# Patient Record
Sex: FEMALE | Race: WHITE
[De-identification: names, ages, dates, MRNs, and addresses within clinical notes are randomized per-mention and may not be internally consistent; named-entity substitution may affect disease eponyms.]

---

## 2020-05-14 ENCOUNTER — HOSPITAL ENCOUNTER (OUTPATIENT)
Dept: HOSPITAL 100 - MTRAD | Age: 55
End: 2020-05-14
Payer: COMMERCIAL

## 2020-05-14 DIAGNOSIS — R05: Primary | ICD-10-CM

## 2020-05-14 PROCEDURE — 71046 X-RAY EXAM CHEST 2 VIEWS: CPT

## 2020-05-26 ENCOUNTER — HOSPITAL ENCOUNTER (OUTPATIENT)
Age: 55
End: 2020-05-26
Payer: COMMERCIAL

## 2020-05-26 DIAGNOSIS — K58.9: ICD-10-CM

## 2020-05-26 DIAGNOSIS — E55.9: Primary | ICD-10-CM

## 2020-05-26 DIAGNOSIS — E78.00: ICD-10-CM

## 2020-05-26 LAB
CHOLEST SERPL-MCNC: 244 MG/DL
TRIGLYCERIDES: 132 MG/DL
VITAMIN D,25 HYDROXY: 19.9 NG/ML
VLDLC SERPL-MCNC: 26 MG/DL (ref 5–40)

## 2020-05-26 PROCEDURE — 80061 LIPID PANEL: CPT

## 2020-05-26 PROCEDURE — 82306 VITAMIN D 25 HYDROXY: CPT

## 2020-05-26 PROCEDURE — 36415 COLL VENOUS BLD VENIPUNCTURE: CPT

## 2020-06-29 ENCOUNTER — HOSPITAL ENCOUNTER (OUTPATIENT)
Age: 55
End: 2020-06-29
Payer: COMMERCIAL

## 2020-06-29 DIAGNOSIS — K50.90: Primary | ICD-10-CM

## 2020-06-29 PROCEDURE — A4216 STERILE WATER/SALINE, 10 ML: HCPCS

## 2020-06-29 PROCEDURE — 74177 CT ABD & PELVIS W/CONTRAST: CPT

## 2020-07-31 ENCOUNTER — HOSPITAL ENCOUNTER (OUTPATIENT)
Age: 55
End: 2020-07-31
Payer: COMMERCIAL

## 2020-07-31 DIAGNOSIS — K50.10: Primary | ICD-10-CM

## 2020-09-08 ENCOUNTER — HOSPITAL ENCOUNTER (OUTPATIENT)
Age: 55
End: 2020-09-08
Payer: COMMERCIAL

## 2020-09-08 DIAGNOSIS — K50.10: Primary | ICD-10-CM

## 2020-09-08 LAB
ALANINE AMINOTRANSFER ALT/SGPT: 22 U/L (ref 13–56)
ALBUMIN SERPL-MCNC: 3.5 G/DL (ref 3.2–5)
ALKALINE PHOSPHATASE: 89 U/L (ref 45–117)
ANION GAP: 6 (ref 5–15)
AST(SGOT): 15 U/L (ref 15–37)
BUN SERPL-MCNC: 13 MG/DL (ref 7–18)
BUN/CREAT RATIO: 16.2 RATIO (ref 10–20)
CALCIUM SERPL-MCNC: 9.2 MG/DL (ref 8.5–10.1)
CARBON DIOXIDE: 29 MMOL/L (ref 21–32)
CHLORIDE: 104 MMOL/L (ref 98–107)
CHOLEST SERPL-MCNC: 241 MG/DL
CRP SERPL-MCNC: 12.4 MG/L (ref 0–3)
EST GLOM FILT RATE - AFR AMER: 95 ML/MIN (ref 60–?)
GLOBULIN: 4 G/DL (ref 2.2–4.2)
GLUCOSE: 93 MG/DL (ref 74–106)
POTASSIUM: 3.5 MMOL/L (ref 3.5–5.1)
TRIGLYCERIDES: 134 MG/DL
VLDLC SERPL-MCNC: 27 MG/DL (ref 5–40)

## 2020-09-08 PROCEDURE — 80053 COMPREHEN METABOLIC PANEL: CPT

## 2020-09-08 PROCEDURE — 36415 COLL VENOUS BLD VENIPUNCTURE: CPT

## 2020-09-08 PROCEDURE — 86140 C-REACTIVE PROTEIN: CPT

## 2020-09-08 PROCEDURE — 87340 HEPATITIS B SURFACE AG IA: CPT

## 2020-09-08 PROCEDURE — 80061 LIPID PANEL: CPT

## 2020-09-08 PROCEDURE — 86480 TB TEST CELL IMMUN MEASURE: CPT

## 2020-09-11 LAB
M TB TUBERC IFN-G BLD QL: 0.03 IU/ML
QNTFERON TB MITOGEN VALUE: > 10 IU/ML
QNTFERON TB NIL VALUE: 0.02 IU/ML
QNTFERON TB2+ AG VALUE: 0.03 IU/ML

## 2020-10-19 ENCOUNTER — HOSPITAL ENCOUNTER (OUTPATIENT)
Age: 55
End: 2020-10-19
Payer: COMMERCIAL

## 2020-10-19 DIAGNOSIS — R00.2: Primary | ICD-10-CM

## 2020-10-19 PROCEDURE — 36415 COLL VENOUS BLD VENIPUNCTURE: CPT

## 2020-10-19 PROCEDURE — 84443 ASSAY THYROID STIM HORMONE: CPT

## 2021-03-08 ENCOUNTER — HOSPITAL ENCOUNTER (OUTPATIENT)
Age: 56
End: 2021-03-08
Payer: COMMERCIAL

## 2021-03-08 DIAGNOSIS — K50.90: Primary | ICD-10-CM

## 2021-03-08 LAB
CRP SERPL-MCNC: 4.92 MG/L (ref 0–3)
DEPRECATED RDW RBC: 41.5 FL (ref 35.1–43.9)
ERYTHROCYTE [DISTWIDTH] IN BLOOD: 12.8 % (ref 11.6–14.6)
HCT VFR BLD AUTO: 37.5 % (ref 37–47)
HEMOGLOBIN: 12.6 G/DL (ref 12–15)
HGB BLD-MCNC: 12.6 G/DL (ref 12–15)
MCV RBC: 88.2 FL (ref 81–99)
MEAN CORP HGB CONC: 33.6 G/DL (ref 32–36)
MEAN PLATELET VOL.: 10.3 FL (ref 6.2–12)
PLATELET # BLD: 392 K/MM3 (ref 150–450)
PLATELET COUNT: 392 K/MM3 (ref 150–450)
RBC # BLD AUTO: 4.25 M/MM3 (ref 4.2–5.4)
RBC DISTRIBUTION WIDTH CV: 12.8 % (ref 11.6–14.6)
RBC DISTRIBUTION WIDTH SD: 41.5 FL (ref 35.1–43.9)
WBC # BLD AUTO: 10 K/MM3 (ref 4.4–11)
WHITE BLOOD COUNT: 10 K/MM3 (ref 4.4–11)

## 2021-03-08 PROCEDURE — 85027 COMPLETE CBC AUTOMATED: CPT

## 2021-03-08 PROCEDURE — 36415 COLL VENOUS BLD VENIPUNCTURE: CPT

## 2021-03-08 PROCEDURE — 86140 C-REACTIVE PROTEIN: CPT

## 2021-03-19 ENCOUNTER — HOSPITAL ENCOUNTER (OUTPATIENT)
Dept: HOSPITAL 100 - IMMUN | Age: 56
End: 2021-03-19
Payer: COMMERCIAL

## 2021-03-19 ENCOUNTER — HOSPITAL ENCOUNTER (OUTPATIENT)
Dept: HOSPITAL 100 - RAD | Age: 56
End: 2021-03-19
Payer: COMMERCIAL

## 2021-03-19 DIAGNOSIS — K50.90: Primary | ICD-10-CM

## 2021-03-19 DIAGNOSIS — Z23: Primary | ICD-10-CM

## 2021-03-19 PROCEDURE — 0001A: CPT

## 2021-03-19 PROCEDURE — 74270 X-RAY XM COLON 1CNTRST STD: CPT

## 2021-03-19 PROCEDURE — 0002A: CPT

## 2021-03-19 PROCEDURE — 91300: CPT

## 2021-03-19 RX ADMIN — RNA INGREDIENT BNT-162B2 30 MCG: 0.23 INJECTION, SUSPENSION INTRAMUSCULAR at 14:48

## 2021-04-09 RX ADMIN — RNA INGREDIENT BNT-162B2 30 MCG: 0.23 INJECTION, SUSPENSION INTRAMUSCULAR at 14:40

## 2021-09-03 ENCOUNTER — HOSPITAL ENCOUNTER (OUTPATIENT)
Age: 56
End: 2021-09-03
Payer: COMMERCIAL

## 2021-09-03 DIAGNOSIS — R53.83: ICD-10-CM

## 2021-09-03 DIAGNOSIS — E55.9: ICD-10-CM

## 2021-09-03 DIAGNOSIS — I10: ICD-10-CM

## 2021-09-03 DIAGNOSIS — K50.90: Primary | ICD-10-CM

## 2021-09-03 LAB
ALANINE AMINOTRANSFER ALT/SGPT: 34 U/L (ref 13–56)
ALBUMIN SERPL-MCNC: 3.6 G/DL (ref 3.2–5)
ALKALINE PHOSPHATASE: 105 U/L (ref 45–117)
ANION GAP: 6 (ref 5–15)
AST(SGOT): 17 U/L (ref 15–37)
BUN SERPL-MCNC: 13 MG/DL (ref 7–18)
BUN/CREAT RATIO: 18.4 RATIO (ref 10–20)
CALCIUM SERPL-MCNC: 8.9 MG/DL (ref 8.5–10.1)
CARBON DIOXIDE: 28 MMOL/L (ref 21–32)
CHLORIDE: 105 MMOL/L (ref 98–107)
CHOLEST SERPL-MCNC: 264 MG/DL
DEPRECATED RDW RBC: 40.4 FL (ref 35.1–43.9)
ERYTHROCYTE [DISTWIDTH] IN BLOOD: 12.6 % (ref 11.6–14.6)
EST GLOM FILT RATE - AFR AMER: 110 ML/MIN (ref 60–?)
GLOBULIN: 4 G/DL (ref 2.2–4.2)
GLUCOSE: 97 MG/DL (ref 74–106)
HCT VFR BLD AUTO: 37.9 % (ref 37–47)
HEMOGLOBIN: 13 G/DL (ref 12–15)
HGB BLD-MCNC: 13 G/DL (ref 12–15)
IMMATURE GRANULOCYTES COUNT: 0.03 X10^3/UL (ref 0–0)
MCV RBC: 87.5 FL (ref 81–99)
MEAN CORP HGB CONC: 34.3 G/DL (ref 32–36)
MEAN PLATELET VOL.: 9.8 FL (ref 6.2–12)
NRBC FLAGGED BY ANALYZER: 0 % (ref 0–5)
PLATELET # BLD: 338 K/MM3 (ref 150–450)
PLATELET COUNT: 338 K/MM3 (ref 150–450)
POTASSIUM: 3.8 MMOL/L (ref 3.5–5.1)
RBC # BLD AUTO: 4.33 M/MM3 (ref 4.2–5.4)
RBC DISTRIBUTION WIDTH CV: 12.6 % (ref 11.6–14.6)
RBC DISTRIBUTION WIDTH SD: 40.4 FL (ref 35.1–43.9)
TRIGLYCERIDES: 154 MG/DL
VITAMIN B12: 915 PG/ML (ref 211–911)
VITAMIN D,25 HYDROXY: 31.5 NG/ML
VLDLC SERPL-MCNC: 31 MG/DL (ref 5–40)
WBC # BLD AUTO: 7.5 K/MM3 (ref 4.4–11)
WHITE BLOOD COUNT: 7.5 K/MM3 (ref 4.4–11)

## 2021-09-03 PROCEDURE — 82306 VITAMIN D 25 HYDROXY: CPT

## 2021-09-03 PROCEDURE — 82607 VITAMIN B-12: CPT

## 2021-09-03 PROCEDURE — 36415 COLL VENOUS BLD VENIPUNCTURE: CPT

## 2021-09-03 PROCEDURE — 80053 COMPREHEN METABOLIC PANEL: CPT

## 2021-09-03 PROCEDURE — 80061 LIPID PANEL: CPT

## 2021-09-03 PROCEDURE — 85025 COMPLETE CBC W/AUTO DIFF WBC: CPT

## 2021-09-07 ENCOUNTER — HOSPITAL ENCOUNTER (OUTPATIENT)
Age: 56
End: 2021-09-07
Payer: COMMERCIAL

## 2021-09-07 DIAGNOSIS — U07.1: Primary | ICD-10-CM

## 2021-09-07 PROCEDURE — U0005 INFEC AGEN DETEC AMPLI PROBE: HCPCS

## 2021-09-07 PROCEDURE — 87635 SARS-COV-2 COVID-19 AMP PRB: CPT

## 2021-09-07 PROCEDURE — U0003 INFECTIOUS AGENT DETECTION BY NUCLEIC ACID (DNA OR RNA); SEVERE ACUTE RESPIRATORY SYNDROME CORONAVIRUS 2 (SARS-COV-2) (CORONAVIRUS DISEASE [COVID-19]), AMPLIFIED PROBE TECHNIQUE, MAKING USE OF HIGH THROUGHPUT TECHNOLOGIES AS DESCRIBED BY CMS-2020-01-R: HCPCS

## 2022-02-25 ENCOUNTER — HOSPITAL ENCOUNTER (OUTPATIENT)
Age: 57
Discharge: HOME | End: 2022-02-25
Payer: COMMERCIAL

## 2022-02-25 DIAGNOSIS — K50.90: Primary | ICD-10-CM

## 2022-02-25 LAB
CREATININE FINGERSTICK: 0.7 MG/DL (ref 0.55–1.02)
EGFR FINGERSTICK: > 60 ML/MIN (ref 60–?)

## 2022-02-25 PROCEDURE — 74177 CT ABD & PELVIS W/CONTRAST: CPT

## 2022-05-23 ENCOUNTER — HOSPITAL ENCOUNTER (OUTPATIENT)
Dept: HOSPITAL 100 - OPBI | Age: 57
Discharge: HOME | End: 2022-05-23
Payer: COMMERCIAL

## 2022-05-23 DIAGNOSIS — Z12.31: Primary | ICD-10-CM

## 2022-05-23 DIAGNOSIS — Z80.3: ICD-10-CM

## 2022-05-23 PROCEDURE — 77063 BREAST TOMOSYNTHESIS BI: CPT

## 2022-05-23 PROCEDURE — 77067 SCR MAMMO BI INCL CAD: CPT

## 2023-03-29 ENCOUNTER — HOSPITAL ENCOUNTER (OUTPATIENT)
Dept: DATA CONVERSION | Facility: HOSPITAL | Age: 58
End: 2023-03-29
Attending: INTERNAL MEDICINE | Admitting: INTERNAL MEDICINE
Payer: COMMERCIAL

## 2023-03-29 DIAGNOSIS — K50.112 CROHN'S DISEASE OF LARGE INTESTINE WITH INTESTINAL OBSTRUCTION (MULTI): ICD-10-CM

## 2023-03-29 DIAGNOSIS — K21.9 GASTRO-ESOPHAGEAL REFLUX DISEASE WITHOUT ESOPHAGITIS: ICD-10-CM

## 2023-03-29 DIAGNOSIS — J45.909 UNSPECIFIED ASTHMA, UNCOMPLICATED (HHS-HCC): ICD-10-CM

## 2023-03-29 DIAGNOSIS — Z88.0 ALLERGY STATUS TO PENICILLIN: ICD-10-CM

## 2023-03-29 DIAGNOSIS — Z98.0 INTESTINAL BYPASS AND ANASTOMOSIS STATUS: ICD-10-CM

## 2023-03-29 DIAGNOSIS — Z12.11 ENCOUNTER FOR SCREENING FOR MALIGNANT NEOPLASM OF COLON: ICD-10-CM

## 2023-03-29 DIAGNOSIS — K50.90 CROHN'S DISEASE, UNSPECIFIED, WITHOUT COMPLICATIONS (MULTI): ICD-10-CM

## 2023-03-29 DIAGNOSIS — E78.00 PURE HYPERCHOLESTEROLEMIA, UNSPECIFIED: ICD-10-CM

## 2023-03-29 DIAGNOSIS — F41.9 ANXIETY DISORDER, UNSPECIFIED: ICD-10-CM

## 2023-03-29 DIAGNOSIS — I10 ESSENTIAL (PRIMARY) HYPERTENSION: ICD-10-CM

## 2023-03-29 DIAGNOSIS — K52.9 NONINFECTIVE GASTROENTERITIS AND COLITIS, UNSPECIFIED: ICD-10-CM

## 2023-03-29 DIAGNOSIS — Z90.49 ACQUIRED ABSENCE OF OTHER SPECIFIED PARTS OF DIGESTIVE TRACT: ICD-10-CM

## 2023-04-06 LAB
COMPLETE PATHOLOGY REPORT: NORMAL
CONVERTED CLINICAL DIAGNOSIS-HISTORY: NORMAL
CONVERTED FINAL DIAGNOSIS: NORMAL
CONVERTED FINAL REPORT PDF LINK TO COPY AND PASTE: NORMAL
CONVERTED GROSS DESCRIPTION: NORMAL

## 2023-09-19 ENCOUNTER — HOSPITAL ENCOUNTER (OUTPATIENT)
Age: 58
Discharge: HOME | End: 2023-09-19
Payer: SELF-PAY

## 2023-09-19 DIAGNOSIS — D50.9: ICD-10-CM

## 2023-09-19 DIAGNOSIS — I10: ICD-10-CM

## 2023-09-19 DIAGNOSIS — E78.00: Primary | ICD-10-CM

## 2023-09-19 DIAGNOSIS — E55.9: ICD-10-CM

## 2023-09-19 LAB
ALANINE AMINOTRANSFER ALT/SGPT: 45 U/L (ref 13–56)
ALBUMIN SERPL-MCNC: 3.4 G/DL (ref 3.2–5)
ALKALINE PHOSPHATASE: 115 U/L (ref 45–117)
ANION GAP: 5 (ref 5–15)
AST(SGOT): 27 U/L (ref 15–37)
BUN SERPL-MCNC: 16 MG/DL (ref 7–18)
BUN/CREAT RATIO: 23 RATIO (ref 10–20)
CALCIUM SERPL-MCNC: 8.8 MG/DL (ref 8.5–10.1)
CARBON DIOXIDE: 27 MMOL/L (ref 21–32)
CHLORIDE: 106 MMOL/L (ref 98–107)
CHOLEST SERPL-MCNC: 165 MG/DL
CREAT UR-MCNC: 105 MG/DL
DEPRECATED RDW RBC: 43.8 FL (ref 35.1–43.9)
ERYTHROCYTE [DISTWIDTH] IN BLOOD: 13.2 % (ref 11.6–14.6)
EST GLOM FILT RATE - AFR AMER: 111 ML/MIN (ref 60–?)
FERRITIN SERPL-MCNC: 43 NG/ML (ref 8–252)
GLOBULIN: 3.7 G/DL (ref 2.2–4.2)
GLUCOSE: 108 MG/DL (ref 74–106)
HCT VFR BLD AUTO: 36.5 % (ref 37–47)
HEMOGLOBIN: 12 G/DL (ref 12–15)
HGB BLD-MCNC: 12 G/DL (ref 12–15)
IMMATURE GRANULOCYTES COUNT: 0.05 X10^3/UL (ref 0–0)
MCV RBC: 90.3 FL (ref 81–99)
MEAN CORP HGB CONC: 32.9 G/DL (ref 32–36)
MEAN PLATELET VOL.: 10 FL (ref 6.2–12)
MICROALBUMIN UR-MCNC: < 5 MG/L
NRBC FLAGGED BY ANALYZER: 0 % (ref 0–5)
PLATELET # BLD: 372 K/MM3 (ref 150–450)
PLATELET COUNT: 372 K/MM3 (ref 150–450)
POTASSIUM: 3.9 MMOL/L (ref 3.5–5.1)
RBC # BLD AUTO: 4.04 M/MM3 (ref 4.2–5.4)
RBC DISTRIBUTION WIDTH CV: 13.2 % (ref 11.6–14.6)
RBC DISTRIBUTION WIDTH SD: 43.8 FL (ref 35.1–43.9)
TRIGLYCERIDES: 120 MG/DL
VITAMIN D,25 HYDROXY: 39.4 NG/ML
VLDLC SERPL-MCNC: 24 MG/DL (ref 5–40)
WBC # BLD AUTO: 9.3 K/MM3 (ref 4.4–11)
WHITE BLOOD COUNT: 9.3 K/MM3 (ref 4.4–11)

## 2023-09-19 PROCEDURE — 82728 ASSAY OF FERRITIN: CPT

## 2023-09-19 PROCEDURE — 36415 COLL VENOUS BLD VENIPUNCTURE: CPT

## 2023-09-19 PROCEDURE — 80053 COMPREHEN METABOLIC PANEL: CPT

## 2023-09-19 PROCEDURE — 85025 COMPLETE CBC W/AUTO DIFF WBC: CPT

## 2023-09-19 PROCEDURE — 83036 HEMOGLOBIN GLYCOSYLATED A1C: CPT

## 2023-09-19 PROCEDURE — 82306 VITAMIN D 25 HYDROXY: CPT

## 2023-09-19 PROCEDURE — 82570 ASSAY OF URINE CREATININE: CPT

## 2023-09-19 PROCEDURE — 80061 LIPID PANEL: CPT

## 2023-09-19 PROCEDURE — 82043 UR ALBUMIN QUANTITATIVE: CPT

## 2023-09-19 PROCEDURE — 84443 ASSAY THYROID STIM HORMONE: CPT

## 2023-12-12 ENCOUNTER — TELEMEDICINE (OUTPATIENT)
Dept: GASTROENTEROLOGY | Facility: HOSPITAL | Age: 58
End: 2023-12-12
Payer: COMMERCIAL

## 2023-12-12 DIAGNOSIS — K50.019 CROHN'S DISEASE OF SMALL INTESTINE WITH COMPLICATION (MULTI): Primary | ICD-10-CM

## 2023-12-12 PROCEDURE — 99214 OFFICE O/P EST MOD 30 MIN: CPT | Performed by: INTERNAL MEDICINE

## 2023-12-12 NOTE — PATIENT INSTRUCTIONS
Thank you for speaking with me today for a virtual telehealth visit.  I am glad that, overall, you are tolerating Stelara and your Crohn's disease seems under control.  I hope that you recover from your current COVID infection without difficulty.  As a reminder, if you are ill, you should not dose with your Stelara but you should hold the medication until feeling better and then resume dosing once improved.  If you have any questions about this call the office.  As we reviewed today:    Plan:  PLAN  1) hold Stelara until at least 12/24/2023.  If at that time you are feeling well, you can dose with your Stelara.  However, if you are not feeling well at that time you should continue to hold Stelara and call the office for instructions  2) once you resume Stelara dosing, continue every 8 weeks  3) check labs in February 2024 as ordered today; you can do this at any [facility] laboratory such as the 1 in [place]  4) as long as symptoms are stable and you are feeling okay, follow-up in the office between September and December 2024  5) call the office with any worsening symptoms, questions, or concerns

## 2023-12-12 NOTE — PROGRESS NOTES
REASON FOR VISIT:  F/U for Crohn's disease    HPI:  Justina Martinez is a 58 y.o. female who presents for follow-up of Crohn's Disease since 2020, with proximal transverse colon stricture with history of LGD on biopsy. Disease was only minimally symptomatic. Patient was treated with Humira every 2 weeks and mesalamine. Her disease was incidentally diagnosed during her first screening colonoscopy 2020. During that procedure, severe inflammation and colonic stricture at transverse colon.     5/2021 subsequent colonoscopy with dilation and biopsy with LGD. Again underwent endoscopy and colonic balloon dilation with biopsy on 11/12/21 with TVA. She never had a complete evaluation beyond the stricture.      CTE on 2/25/22 demonstrated diffuse narrowing from hepatic flexure to rectosigmoid colon. She underwent exploratory laparotomy, abdominal colectomy and CAROL on 6/13/22.     Seen for post-op discussion 8/2022. She never did well on Humira and didn't seem to tolerate well. Decision made to initiate Stelara (9/2022) for post-op maintenance. When first seen, stools were 8 times daily; loperamide added.  With time did better with stools down to 4 daily.      3/2023 flex sig showed inactive rectal disease.  Otherwise was normal.    In follow-up today, patient reports that she had an upper respiratory infection in November 2023.  She was treated with antibiotics.  She did not have labs or x-rays.  This improved, but, unfortunately her  became ill with COVID and now she is also COVID-positive.  She is due to dose with her Stelara on Sunday, 12/17/2024.    Overall, besides the upper respiratory infection and the COVID she has been doing well.  She has tolerated Stelara without difficulty.  Her bowel movements are 3-4 daily.  The stool is soft.  Is looser if she eats fatty food.  She has no constipation.  Her bowel habits vary a bit in form based on what she eats.  She is slowly adding fruits and vegetables to her diet.   She does have some urgency and has had a rare episode of incontinence.  She is tolerating Stelara without difficulty.  There are no side effects associated with this.  She does, however, notes some ankle and foot tenderness.  REVIEW OF SYSTEMS  Complete review of systems otherwise negative per complaint    Past Medical History:   Diagnosis Date    Essential (primary) hypertension 2022    Hypertension, unspecified type    Personal history of other endocrine, nutritional and metabolic disease     History of high cholesterol    Personal history of other endocrine, nutritional and metabolic disease     History of diabetes mellitus       Past Surgical History:   Procedure Laterality Date    OTHER SURGICAL HISTORY  02/15/2022     section    OTHER SURGICAL HISTORY  02/15/2022    Back surgery    OTHER SURGICAL HISTORY  02/15/2022    History of prior surgery    OTHER SURGICAL HISTORY  2022    Colonoscopy    OTHER SURGICAL HISTORY  2022    Appendectomy    OTHER SURGICAL HISTORY  2022    Colectomy subtotal       No current outpatient medications on file.     No current facility-administered medications for this visit.       PHYSICAL EXAM:  There were no vitals taken for this visit.     Lab Results   Component Value Date    WBC 8.6 2023    HGB 12.0 2023    HCT 36.6 2023    MCV 89 2023     2023     Lab Results   Component Value Date    ALT 28 2023    AST 20 2023    ALKPHOS 102 2023    BILITOT 0.5 2023         ASSESSMENT  #Crohn's disease-diagnosed in  with colonic stricture and low-grade dysplasia.  She is status post abdominal colectomy with ileorectal anastomosis in 2021.  Postoperatively we started Stelara which she has been tolerating without difficulty.  Bowel habits have gradually improved.  Flexible sigmoidoscopy in 2023 showed no disease activity.    She now has COVID and is due to dose with Stelara in several days.   She was instructed to hold her Stelara until at least 12/24/2023.  If she is feeling well at that time she can then go ahead and dose.  We did review risk associated with Stelara therapy including but not limited to the risk of infection and lymphoma.  She knows to hold her Stelara if she is feeling ill at the time that she is due to dose.    She is due for some laboratory monitoring in the early part of 2024 which will be ordered.  Otherwise, as long as she is doing well she can follow-up sometime between 9 and 12 months.  I do not think she needs another sigmoidoscopy until 2026 unless she has worsening symptoms suggesting disease activity.    PLAN  1) hold Stelara until at least 12/24/2023.  If at that time you are feeling well, you can dose with your Stelara.  However, if you are not feeling well at that time you should continue to hold Stelara and call the office for instructions  2) once you resume Stelara dosing, continue every 8 weeks  3) check labs in February 2024 as ordered today; you can do this at any [facility] laboratory such as the 1 in [place]  4) as long as symptoms are stable and you are feeling okay, follow-up in the office between September and December 2024  5) call the office with any worsening symptoms, questions, or concerns

## 2024-01-18 RX ORDER — USTEKINUMAB 90 MG/ML
90 INJECTION, SOLUTION SUBCUTANEOUS
COMMUNITY
Start: 2022-08-30 | End: 2024-01-22

## 2024-01-22 DIAGNOSIS — K50.019 CROHN'S DISEASE OF SMALL INTESTINE WITH COMPLICATION (MULTI): Primary | ICD-10-CM

## 2024-01-22 RX ORDER — USTEKINUMAB 90 MG/ML
90 INJECTION, SOLUTION SUBCUTANEOUS
Qty: 1 ML | Refills: 5 | Status: SHIPPED | OUTPATIENT
Start: 2024-01-22

## 2024-04-24 ENCOUNTER — LAB (OUTPATIENT)
Dept: LAB | Facility: LAB | Age: 59
End: 2024-04-24
Payer: COMMERCIAL

## 2024-04-24 DIAGNOSIS — K50.019 CROHN'S DISEASE OF SMALL INTESTINE WITH COMPLICATION (MULTI): ICD-10-CM

## 2024-04-24 LAB
ALBUMIN SERPL BCP-MCNC: 4 G/DL (ref 3.4–5)
ALP SERPL-CCNC: 81 U/L (ref 33–110)
ALT SERPL W P-5'-P-CCNC: 25 U/L (ref 7–45)
AST SERPL W P-5'-P-CCNC: 19 U/L (ref 9–39)
BASOPHILS # BLD AUTO: 0.06 X10*3/UL (ref 0–0.1)
BASOPHILS NFR BLD AUTO: 0.7 %
BILIRUB DIRECT SERPL-MCNC: 0.1 MG/DL (ref 0–0.3)
BILIRUB SERPL-MCNC: 0.5 MG/DL (ref 0–1.2)
CRP SERPL-MCNC: 0.66 MG/DL
EOSINOPHIL # BLD AUTO: 0.15 X10*3/UL (ref 0–0.7)
EOSINOPHIL NFR BLD AUTO: 1.8 %
ERYTHROCYTE [DISTWIDTH] IN BLOOD BY AUTOMATED COUNT: 13.2 % (ref 11.5–14.5)
HCT VFR BLD AUTO: 37.7 % (ref 36–46)
HGB BLD-MCNC: 12.5 G/DL (ref 12–16)
IMM GRANULOCYTES # BLD AUTO: 0.02 X10*3/UL (ref 0–0.7)
IMM GRANULOCYTES NFR BLD AUTO: 0.2 % (ref 0–0.9)
LYMPHOCYTES # BLD AUTO: 2.41 X10*3/UL (ref 1.2–4.8)
LYMPHOCYTES NFR BLD AUTO: 28.9 %
MCH RBC QN AUTO: 29.4 PG (ref 26–34)
MCHC RBC AUTO-ENTMCNC: 33.2 G/DL (ref 32–36)
MCV RBC AUTO: 89 FL (ref 80–100)
MONOCYTES # BLD AUTO: 0.43 X10*3/UL (ref 0.1–1)
MONOCYTES NFR BLD AUTO: 5.2 %
NEUTROPHILS # BLD AUTO: 5.26 X10*3/UL (ref 1.2–7.7)
NEUTROPHILS NFR BLD AUTO: 63.2 %
NRBC BLD-RTO: 0 /100 WBCS (ref 0–0)
PLATELET # BLD AUTO: 381 X10*3/UL (ref 150–450)
PROT SERPL-MCNC: 6.5 G/DL (ref 6.4–8.2)
RBC # BLD AUTO: 4.25 X10*6/UL (ref 4–5.2)
VIT B12 SERPL-MCNC: 740 PG/ML (ref 211–911)
WBC # BLD AUTO: 8.3 X10*3/UL (ref 4.4–11.3)

## 2024-04-24 PROCEDURE — 85025 COMPLETE CBC W/AUTO DIFF WBC: CPT

## 2024-04-24 PROCEDURE — 86140 C-REACTIVE PROTEIN: CPT

## 2024-04-24 PROCEDURE — 36415 COLL VENOUS BLD VENIPUNCTURE: CPT

## 2024-04-24 PROCEDURE — 82607 VITAMIN B-12: CPT

## 2024-04-24 PROCEDURE — 86481 TB AG RESPONSE T-CELL SUSP: CPT

## 2024-04-24 PROCEDURE — 80076 HEPATIC FUNCTION PANEL: CPT

## 2024-04-26 LAB
NIL(NEG) CONTROL SPOT COUNT: NORMAL
PANEL A SPOT COUNT: 0
PANEL B SPOT COUNT: 0
POS CONTROL SPOT COUNT: NORMAL
T-SPOT. TB INTERPRETATION: NEGATIVE

## 2024-05-15 ENCOUNTER — HOSPITAL ENCOUNTER (OUTPATIENT)
Age: 59
Discharge: HOME | End: 2024-05-15
Payer: COMMERCIAL

## 2024-05-15 DIAGNOSIS — R00.2: Primary | ICD-10-CM

## 2024-05-15 DIAGNOSIS — R60.0: ICD-10-CM

## 2024-05-15 LAB
ALANINE AMINOTRANSFER ALT/SGPT: 34 U/L (ref 13–56)
ALBUMIN SERPL-MCNC: 3.6 G/DL (ref 3.2–5)
ALKALINE PHOSPHATASE: 96 U/L (ref 45–117)
ANION GAP: 6 (ref 5–15)
AST(SGOT): 20 U/L (ref 15–37)
BUN SERPL-MCNC: 16 MG/DL (ref 7–18)
BUN/CREAT RATIO: 22.6 RATIO (ref 10–20)
CALCIUM SERPL-MCNC: 9.5 MG/DL (ref 8.5–10.1)
CARBON DIOXIDE: 28 MMOL/L (ref 21–32)
CHLORIDE: 105 MMOL/L (ref 98–107)
D-DIMER QUANTITATIVE (DVT/PE): < 0.27 FEU/UG/M (ref 0.27–0.49)
DEPRECATED RDW RBC: 41.1 FL (ref 35.1–43.9)
ERYTHROCYTE [DISTWIDTH] IN BLOOD: 12.9 % (ref 11.6–14.6)
EST GLOM FILT RATE - AFR AMER: 109 ML/MIN (ref 60–?)
GLOBULIN: 4.1 G/DL (ref 2.2–4.2)
GLUCOSE: 115 MG/DL (ref 74–106)
HCT VFR BLD AUTO: 38.2 % (ref 37–47)
HGB BLD-MCNC: 12.9 G/DL (ref 12–15)
IMMATURE GRANULOCYTES COUNT: 0.03 X10^3/UL (ref 0–0)
MAGNESIUM: 2.4 MG/DL (ref 1.6–2.6)
MCV RBC: 87 FL (ref 81–99)
MEAN CORP HGB CONC: 33.8 G/DL (ref 32–36)
MEAN PLATELET VOL.: 9.3 FL (ref 6.2–12)
NRBC FLAGGED BY ANALYZER: 0 % (ref 0–5)
PLATELET # BLD: 379 K/MM3 (ref 150–450)
POTASSIUM: 4.3 MMOL/L (ref 3.5–5.1)
RBC # BLD AUTO: 4.39 M/MM3 (ref 4.2–5.4)
WBC # BLD AUTO: 11.3 K/MM3 (ref 4.4–11)

## 2024-05-15 PROCEDURE — 36415 COLL VENOUS BLD VENIPUNCTURE: CPT

## 2024-05-15 PROCEDURE — 84443 ASSAY THYROID STIM HORMONE: CPT

## 2024-05-15 PROCEDURE — 85379 FIBRIN DEGRADATION QUANT: CPT

## 2024-05-15 PROCEDURE — 83735 ASSAY OF MAGNESIUM: CPT

## 2024-05-15 PROCEDURE — 85025 COMPLETE CBC W/AUTO DIFF WBC: CPT

## 2024-05-15 PROCEDURE — 80053 COMPREHEN METABOLIC PANEL: CPT

## 2024-06-14 ENCOUNTER — HOSPITAL ENCOUNTER (OUTPATIENT)
Dept: HOSPITAL 100 - CVS | Age: 59
Discharge: HOME | End: 2024-06-14
Payer: COMMERCIAL

## 2024-06-14 DIAGNOSIS — R07.9: Primary | ICD-10-CM

## 2024-06-14 PROCEDURE — 93017 CV STRESS TEST TRACING ONLY: CPT

## 2024-06-14 PROCEDURE — 78452 HT MUSCLE IMAGE SPECT MULT: CPT

## 2024-06-14 PROCEDURE — A9500 TC99M SESTAMIBI: HCPCS

## 2024-12-31 DIAGNOSIS — K50.019 CROHN'S DISEASE OF SMALL INTESTINE WITH COMPLICATION (MULTI): ICD-10-CM

## 2024-12-31 RX ORDER — USTEKINUMAB 90 MG/ML
90 INJECTION, SOLUTION SUBCUTANEOUS
Qty: 1 ML | Refills: 5 | Status: SHIPPED | OUTPATIENT
Start: 2024-12-31

## 2025-02-17 NOTE — PROGRESS NOTES
REASON FOR VISIT:  Crohn's disease    HPI:  Justina Martinez is a 59 y.o. female who presents for follow-up.  Last seen virtually 12/2023.  Crohn's Disease since 2020, with proximal transverse colon stricture with history of LGD on biopsy. Disease was only minimally symptomatic. Patient was treated with Humira every 2 weeks and mesalamine. Her disease was incidentally diagnosed during her first screening colonoscopy 2020. During that procedure, found severe inflammation and colonic stricture at transverse colon.     5/2021 subsequent colonoscopy with dilation and biopsy with LGD. Again underwent endoscopy and colonic balloon dilation with biopsy on 11/12/21 with TVA. She never had a complete evaluation beyond the stricture.      CTE on 2/25/22 demonstrated diffuse narrowing from hepatic flexure to rectosigmoid colon. She underwent exploratory laparotomy, abdominal colectomy and CAROL on 6/13/22.  Path showed multifocal low-grade dysplasia in a background of chronic active colitis in the transverse colon.  In the ascending colon there was a focus of high-grade dysplasia in a background of sessile serrated changes.     Seen for post-op discussion 8/2022. She never did well on Humira and didn't seem to tolerate well. Decision made to initiate Stelara (9/2022) for post-op maintenance. When first seen, stools were 8 times daily; loperamide added.  With time did better with stools down to 4 daily.       3/2023 flex sig showed inactive rectal disease.  Otherwise was normal.  Path with patchy chronic inflammation in rectum     Seen virtually 12/2023 and reported that she had an upper respiratory infection in November 2023.  She was treated with antibiotics.  She did not have labs or x-rays.  This improved, but, unfortunately her  became ill with COVID and patient also became COVID-positive.  Otherwise was doing well . Tolerating Stelara without difficulty.  Her bowel movements were 3-4 daily.  Rare urgency and rare  "fecal soiling.    In follow-up today, overall doing OK.  She has gained a lot of weight, weight is up 31 lbs since surgery.  She feels like she has \"a lot of inflammation\" with discomfort in knees and ankles.  Some ankle swelling.  Reports a lot of stress personally and at work.  Wants to get weight off.  Plans to meet with nutritionist in [place].      Bms depend on what eats.  Stools 3-5 daily.  Some urgency.  Stool usually soft to mushy.  No nocturnal stools; first thing in AM.  Goes after dinner and then again between 4-5 AM when she is getting up.  Very rare accidents; unable to always know if gas or if something will come out.  Happens 2-3 times a year.  Does not take loperamide.  She has also never tried a fiber supplement.  Trying to add more fiber to diet. Sometimes recognized food in the stool.      No issues with Stelara.      REVIEW OF SYSTEMS  Mild occasional nausea; no emesis.  Some heartburn, usually daily.  Uses Tums frequently.  Some LUQ gas pains.    Past Medical History:   Diagnosis Date    Essential (primary) hypertension 2022    Hypertension, unspecified type    Personal history of other endocrine, nutritional and metabolic disease     History of high cholesterol    Personal history of other endocrine, nutritional and metabolic disease     History of diabetes mellitus       Past Surgical History:   Procedure Laterality Date    OTHER SURGICAL HISTORY  02/15/2022     section    OTHER SURGICAL HISTORY  02/15/2022    Back surgery    OTHER SURGICAL HISTORY  02/15/2022    History of prior surgery    OTHER SURGICAL HISTORY  2022    Colonoscopy    OTHER SURGICAL HISTORY  2022    Appendectomy    OTHER SURGICAL HISTORY  2022    Colectomy subtotal       Current Outpatient Medications   Medication Sig Dispense Refill    Stelara injection INJECT 90 MG UNDER THE SKIN EVERY 8 WEEKS 1 mL 5     No current facility-administered medications for this visit.       PHYSICAL EXAM:  BP " 146/76   Pulse 79   Temp 36.4 °C (97.6 °F)   Wt 109 kg (239 lb 14.4 oz)   SpO2 95% Comment: ra  BMI 45.33 kg/m²   Vital signs reviewed.  Obese in NAD.  Patient alert and oriented in no acute distress  Anicteric  No cervical adenopathy  Cardiac exam regular rate and rhythm S1-S2 without murmurs gallops or rubs  Lungs clear to auscultation bilaterally  Abdomen soft and nontender without organomegaly or mass.  No rebound or guarding.  Bowel sounds present  Extremities without edema  Neurologically grossly intact      Lab Results   Component Value Date    WBC 8.3 04/24/2024    HGB 12.5 04/24/2024    HCT 37.7 04/24/2024    MCV 89 04/24/2024     04/24/2024     Lab Results   Component Value Date    ALT 25 04/24/2024    AST 19 04/24/2024    ALKPHOS 81 04/24/2024    BILITOT 0.5 04/24/2024     ASSESSMENT  #Crohn's disease-I suspect that her Crohn's disease is under good control.  We will go ahead and check a stool fecal calprotectin and plan for endoscopic surveillance given her history of dysplasia at surgery.  Will continue Stelara therapy.  Labs are due for monitoring.    Since stool frequency and looseness is an issue, I have recommended starting a fiber supplement and also adding loperamide 1 to 2 tablets 2-3 times daily as needed.    # Heartburn/GERD-this is likely related to her increased weight gain.  We will start famotidine 40 mg twice daily    # Weight gain-she is frustrated with her weight gain.  She is planning to meet with a dietitian locally in [place] for advice at dropping weight.  I told her that if she is unable to manage with just dietary adjustment, we could refer to bariatric surgery for evaluation, or, she could speak with her primary physician regarding a GLP-1 agonist.    PLAN  1) check labs  2) check stool studies  3) continue Stelara every 8 weeks  4) try adding a fiber supplement, such as Metamucil, Benefiber, Citrucel or similar psyllium fiber supplement starting at 1 teaspoon daily  in 8 ounces of water for 1 week, then increasing by 1 teaspoon every week until taking 3 teaspoons daily as tolerated  5) also try adding loperamide (Imodium) 1 to 2 tablets 2-3 times daily as needed to decrease stool frequency.  If you become constipated, you need to decrease the amount of loperamide that you are taking  6) schedule flexible sigmoidoscopy for the [facility] sometime over the summer.  One of our schedulers, Nilda James, will contact you to set up this appointment.  If you have any questions you can reach her at [phone].  7) I support your plan to speak with a nutritionist/dietitian regarding dietary changes to help with weight reduction  8) if you try dietary adjustment and it is not helpful, you could discuss starting on a GLP-1 agonist (like Ozempic or Wegovy) with your primary care physician  9) as long as symptoms are stable, follow-up in the office in 1 year

## 2025-02-18 ENCOUNTER — OFFICE VISIT (OUTPATIENT)
Dept: GASTROENTEROLOGY | Facility: HOSPITAL | Age: 60
End: 2025-02-18
Payer: COMMERCIAL

## 2025-02-18 VITALS
TEMPERATURE: 97.6 F | DIASTOLIC BLOOD PRESSURE: 76 MMHG | SYSTOLIC BLOOD PRESSURE: 146 MMHG | HEART RATE: 79 BPM | WEIGHT: 239.9 LBS | OXYGEN SATURATION: 95 % | BODY MASS INDEX: 45.33 KG/M2

## 2025-02-18 DIAGNOSIS — K50.019 CROHN'S DISEASE OF SMALL INTESTINE WITH COMPLICATION (MULTI): Primary | ICD-10-CM

## 2025-02-18 DIAGNOSIS — K21.9 GASTROESOPHAGEAL REFLUX DISEASE, UNSPECIFIED WHETHER ESOPHAGITIS PRESENT: Primary | ICD-10-CM

## 2025-02-18 DIAGNOSIS — K21.9 GASTROESOPHAGEAL REFLUX DISEASE, UNSPECIFIED WHETHER ESOPHAGITIS PRESENT: ICD-10-CM

## 2025-02-18 DIAGNOSIS — E66.9 OBESITY (BMI 30-39.9): ICD-10-CM

## 2025-02-18 PROCEDURE — 1036F TOBACCO NON-USER: CPT | Performed by: INTERNAL MEDICINE

## 2025-02-18 PROCEDURE — 99214 OFFICE O/P EST MOD 30 MIN: CPT | Performed by: INTERNAL MEDICINE

## 2025-02-18 RX ORDER — DOXYCYCLINE HYCLATE 20 MG
TABLET ORAL
COMMUNITY

## 2025-02-18 RX ORDER — LOSARTAN POTASSIUM 50 MG/1
1 TABLET ORAL DAILY
COMMUNITY

## 2025-02-18 RX ORDER — VIT C/E/ZN/COPPR/LUTEIN/ZEAXAN 250MG-90MG
1 CAPSULE ORAL DAILY
COMMUNITY

## 2025-02-18 RX ORDER — ROSUVASTATIN CALCIUM 10 MG/1
TABLET, COATED ORAL
COMMUNITY
Start: 2024-11-26

## 2025-02-18 RX ORDER — METOPROLOL SUCCINATE 50 MG/1
50 TABLET, EXTENDED RELEASE ORAL DAILY
COMMUNITY

## 2025-02-18 RX ORDER — FAMOTIDINE 40 MG/1
40 TABLET, FILM COATED ORAL 2 TIMES DAILY
Qty: 60 TABLET | Refills: 11 | Status: SHIPPED | OUTPATIENT
Start: 2025-02-18 | End: 2026-02-18

## 2025-02-18 RX ORDER — MONTELUKAST SODIUM 10 MG/1
TABLET ORAL
COMMUNITY

## 2025-02-18 ASSESSMENT — ENCOUNTER SYMPTOMS
DEPRESSION: 0
LOSS OF SENSATION IN FEET: 0
OCCASIONAL FEELINGS OF UNSTEADINESS: 0

## 2025-02-18 ASSESSMENT — PAIN SCALES - GENERAL: PAINLEVEL_OUTOF10: 0-NO PAIN

## 2025-02-18 NOTE — PATIENT INSTRUCTIONS
As we discussed today, I think that your Crohn's disease is likely under adequate control on Stelara therapy.  Your bowel habits likely related to the change in anatomy from your surgery.  We will do some evaluation to make sure that there is no precancerous changes (dysplasia) or inflammation.  As reviewed today:    PLAN  1) check labs  2) check stool studies  3) continue Stelara every 8 weeks  4) try adding a fiber supplement, such as Metamucil, Benefiber, Citrucel or similar psyllium fiber supplement starting at 1 teaspoon daily in 8 ounces of water for 1 week, then increasing by 1 teaspoon every week until taking 3 teaspoons daily as tolerated  5) also try adding loperamide (Imodium) 1 to 2 tablets 2-3 times daily as needed to decrease stool frequency.  If you become constipated, you need to decrease the amount of loperamide that you are taking  6) schedule flexible sigmoidoscopy for the [facility] sometime over the summer.  One of our schedulers, Nilda James, will contact you to set up this appointment.  If you have any questions you can reach her at [phone].  7) I support your plan to speak with a nutritionist/dietitian regarding dietary changes to help with weight reduction  8) if you try dietary adjustment and it is not helpful, you could discuss starting on a GLP-1 agonist (like Ozempic or Wegovy) with your primary care physician  9) as long as symptoms are stable, follow-up in the office in 1 year

## 2025-02-19 RX ORDER — LOPERAMIDE HYDROCHLORIDE 2 MG/1
4 CAPSULE ORAL 3 TIMES DAILY PRN
Qty: 180 CAPSULE | Refills: 3 | Status: SHIPPED | OUTPATIENT
Start: 2025-02-19

## 2025-03-04 ENCOUNTER — TELEPHONE (OUTPATIENT)
Dept: GASTROENTEROLOGY | Facility: HOSPITAL | Age: 60
End: 2025-03-04
Payer: COMMERCIAL

## 2025-03-16 LAB
ALBUMIN SERPL-MCNC: 4.3 G/DL (ref 3.6–5.1)
ALBUMIN/GLOB SERPL: 1.6 (CALC) (ref 1–2.5)
ALP SERPL-CCNC: 86 U/L (ref 37–153)
ALT SERPL-CCNC: 26 U/L (ref 6–29)
AST SERPL-CCNC: 21 U/L (ref 10–35)
BASOPHILS # BLD AUTO: 61 CELLS/UL (ref 0–200)
BASOPHILS NFR BLD AUTO: 0.6 %
BILIRUB DIRECT SERPL-MCNC: 0.1 MG/DL
BILIRUB INDIRECT SERPL-MCNC: 0.3 MG/DL (CALC) (ref 0.2–1.2)
BILIRUB SERPL-MCNC: 0.4 MG/DL (ref 0.2–1.2)
CALPROTECTIN STL-MCNT: NORMAL UG/G
CRP SERPL-MCNC: NORMAL MG/L
EOSINOPHIL # BLD AUTO: 141 CELLS/UL (ref 15–500)
EOSINOPHIL NFR BLD AUTO: 1.4 %
ERYTHROCYTE [DISTWIDTH] IN BLOOD BY AUTOMATED COUNT: 12.9 % (ref 11–15)
GLOBULIN SER CALC-MCNC: 2.7 G/DL (CALC) (ref 1.9–3.7)
HCT VFR BLD AUTO: 39.3 % (ref 35–45)
HGB BLD-MCNC: 13 G/DL (ref 11.7–15.5)
IGNF NEG CNTRL BLD: NORMAL
LYMPHOCYTES # BLD AUTO: 3495 CELLS/UL (ref 850–3900)
LYMPHOCYTES NFR BLD AUTO: 34.6 %
M TB IFN-G BLD-IMP: NEGATIVE
MCH RBC QN AUTO: 29.8 PG (ref 27–33)
MCHC RBC AUTO-ENTMCNC: 33.1 G/DL (ref 32–36)
MCV RBC AUTO: 90.1 FL (ref 80–100)
MITOGEN IGNF.SPOT COUNT BLD: NORMAL
MONOCYTES # BLD AUTO: 667 CELLS/UL (ref 200–950)
MONOCYTES NFR BLD AUTO: 6.6 %
NEUTROPHILS # BLD AUTO: 5737 CELLS/UL (ref 1500–7800)
NEUTROPHILS NFR BLD AUTO: 56.8 %
PLATELET # BLD AUTO: 381 THOUSAND/UL (ref 140–400)
PMV BLD REES-ECKER: 9.8 FL (ref 7.5–12.5)
PROT SERPL-MCNC: 7 G/DL (ref 6.1–8.1)
QUEST PANEL A SPOT COUNT: 1
QUEST PANEL B SPOT COUNT: 1
RBC # BLD AUTO: 4.36 MILLION/UL (ref 3.8–5.1)
WBC # BLD AUTO: 10.1 THOUSAND/UL (ref 3.8–10.8)

## 2025-03-21 LAB
ALBUMIN SERPL-MCNC: 4.3 G/DL (ref 3.6–5.1)
ALBUMIN/GLOB SERPL: 1.6 (CALC) (ref 1–2.5)
ALP SERPL-CCNC: 86 U/L (ref 37–153)
ALT SERPL-CCNC: 26 U/L (ref 6–29)
AST SERPL-CCNC: 21 U/L (ref 10–35)
BASOPHILS # BLD AUTO: 61 CELLS/UL (ref 0–200)
BASOPHILS NFR BLD AUTO: 0.6 %
BILIRUB DIRECT SERPL-MCNC: 0.1 MG/DL
BILIRUB INDIRECT SERPL-MCNC: 0.3 MG/DL (CALC) (ref 0.2–1.2)
BILIRUB SERPL-MCNC: 0.4 MG/DL (ref 0.2–1.2)
CALPROTECTIN STL-MCNT: 46 MCG/G
CRP SERPL-MCNC: 8.3 MG/L
EOSINOPHIL # BLD AUTO: 141 CELLS/UL (ref 15–500)
EOSINOPHIL NFR BLD AUTO: 1.4 %
ERYTHROCYTE [DISTWIDTH] IN BLOOD BY AUTOMATED COUNT: 12.9 % (ref 11–15)
GLOBULIN SER CALC-MCNC: 2.7 G/DL (CALC) (ref 1.9–3.7)
HCT VFR BLD AUTO: 39.3 % (ref 35–45)
HGB BLD-MCNC: 13 G/DL (ref 11.7–15.5)
IGNF NEG CNTRL BLD: NORMAL
LYMPHOCYTES # BLD AUTO: 3495 CELLS/UL (ref 850–3900)
LYMPHOCYTES NFR BLD AUTO: 34.6 %
M TB IFN-G BLD-IMP: NEGATIVE
MCH RBC QN AUTO: 29.8 PG (ref 27–33)
MCHC RBC AUTO-ENTMCNC: 33.1 G/DL (ref 32–36)
MCV RBC AUTO: 90.1 FL (ref 80–100)
MITOGEN IGNF.SPOT COUNT BLD: NORMAL
MONOCYTES # BLD AUTO: 667 CELLS/UL (ref 200–950)
MONOCYTES NFR BLD AUTO: 6.6 %
NEUTROPHILS # BLD AUTO: 5737 CELLS/UL (ref 1500–7800)
NEUTROPHILS NFR BLD AUTO: 56.8 %
PLATELET # BLD AUTO: 381 THOUSAND/UL (ref 140–400)
PMV BLD REES-ECKER: 9.8 FL (ref 7.5–12.5)
PROT SERPL-MCNC: 7 G/DL (ref 6.1–8.1)
QUEST PANEL A SPOT COUNT: 1
QUEST PANEL B SPOT COUNT: 1
RBC # BLD AUTO: 4.36 MILLION/UL (ref 3.8–5.1)
WBC # BLD AUTO: 10.1 THOUSAND/UL (ref 3.8–10.8)

## 2025-04-04 ENCOUNTER — HOSPITAL ENCOUNTER (OUTPATIENT)
Dept: HOSPITAL 100 - MTRAD | Age: 60
Discharge: HOME | End: 2025-04-04
Payer: COMMERCIAL

## 2025-04-04 DIAGNOSIS — M25.571: ICD-10-CM

## 2025-04-04 DIAGNOSIS — M79.672: Primary | ICD-10-CM

## 2025-04-04 PROCEDURE — 73630 X-RAY EXAM OF FOOT: CPT

## 2025-04-04 PROCEDURE — 73610 X-RAY EXAM OF ANKLE: CPT

## 2025-04-09 ENCOUNTER — HOSPITAL ENCOUNTER (OUTPATIENT)
Dept: HOSPITAL 100 - NS | Age: 60
LOS: 21 days | End: 2025-04-30
Payer: COMMERCIAL

## 2025-04-09 DIAGNOSIS — E66.9: ICD-10-CM

## 2025-04-09 DIAGNOSIS — Z71.3: Primary | ICD-10-CM

## 2025-04-09 PROCEDURE — 97802 MEDICAL NUTRITION INDIV IN: CPT

## 2025-04-24 ENCOUNTER — HOSPITAL ENCOUNTER (OUTPATIENT)
Dept: HOSPITAL 100 - OPBD | Age: 60
Discharge: HOME | End: 2025-04-24
Payer: COMMERCIAL

## 2025-04-24 DIAGNOSIS — M85.80: Primary | ICD-10-CM

## 2025-04-24 PROCEDURE — 77080 DXA BONE DENSITY AXIAL: CPT

## 2025-05-13 ENCOUNTER — HOSPITAL ENCOUNTER (OUTPATIENT)
Dept: HOSPITAL 100 - NS | Age: 60
LOS: 18 days | End: 2025-05-31
Payer: COMMERCIAL

## 2025-05-13 DIAGNOSIS — E66.9: ICD-10-CM

## 2025-05-13 DIAGNOSIS — E78.00: ICD-10-CM

## 2025-05-13 DIAGNOSIS — Z71.3: Primary | ICD-10-CM

## 2025-05-13 PROCEDURE — 97803 MED NUTRITION INDIV SUBSEQ: CPT

## 2025-06-17 ENCOUNTER — HOSPITAL ENCOUNTER (OUTPATIENT)
Age: 60
Discharge: HOME | End: 2025-06-17
Payer: COMMERCIAL

## 2025-06-17 DIAGNOSIS — M19.072: ICD-10-CM

## 2025-06-17 DIAGNOSIS — M19.071: ICD-10-CM

## 2025-06-17 DIAGNOSIS — M76.829: Primary | ICD-10-CM

## 2025-06-17 PROCEDURE — 73721 MRI JNT OF LWR EXTRE W/O DYE: CPT

## 2025-07-03 ENCOUNTER — TELEPHONE (OUTPATIENT)
Dept: GASTROENTEROLOGY | Facility: HOSPITAL | Age: 60
End: 2025-07-03
Payer: COMMERCIAL

## 2025-07-10 RX ORDER — ONDANSETRON HYDROCHLORIDE 2 MG/ML
4 INJECTION, SOLUTION INTRAVENOUS ONCE AS NEEDED
Status: CANCELLED | OUTPATIENT
Start: 2025-07-10

## 2025-07-11 ENCOUNTER — DOCUMENTATION (OUTPATIENT)
Dept: GASTROENTEROLOGY | Facility: HOSPITAL | Age: 60
End: 2025-07-11

## 2025-07-11 ENCOUNTER — HOSPITAL ENCOUNTER (OUTPATIENT)
Dept: GASTROENTEROLOGY | Facility: HOSPITAL | Age: 60
Discharge: HOME | End: 2025-07-11
Payer: COMMERCIAL

## 2025-07-11 VITALS
DIASTOLIC BLOOD PRESSURE: 50 MMHG | HEART RATE: 75 BPM | RESPIRATION RATE: 15 BRPM | SYSTOLIC BLOOD PRESSURE: 122 MMHG | HEIGHT: 61 IN | TEMPERATURE: 97.2 F | OXYGEN SATURATION: 97 % | WEIGHT: 224.21 LBS | BODY MASS INDEX: 42.33 KG/M2

## 2025-07-11 DIAGNOSIS — K50.019 CROHN'S DISEASE OF SMALL INTESTINE WITH COMPLICATION (MULTI): ICD-10-CM

## 2025-07-11 PROCEDURE — 45380 COLONOSCOPY AND BIOPSY: CPT | Performed by: INTERNAL MEDICINE

## 2025-07-11 PROCEDURE — 7100000009 HC PHASE TWO TIME - INITIAL BASE CHARGE

## 2025-07-11 PROCEDURE — 7100000010 HC PHASE TWO TIME - EACH INCREMENTAL 1 MINUTE

## 2025-07-11 PROCEDURE — 2500000004 HC RX 250 GENERAL PHARMACY W/ HCPCS (ALT 636 FOR OP/ED): Performed by: INTERNAL MEDICINE

## 2025-07-11 PROCEDURE — 3700000012 HC SEDATION LEVEL 5+ TIME - INITIAL 15 MINUTES 5/> YEARS

## 2025-07-11 RX ORDER — FENTANYL CITRATE 50 UG/ML
INJECTION, SOLUTION INTRAMUSCULAR; INTRAVENOUS AS NEEDED
Status: COMPLETED | OUTPATIENT
Start: 2025-07-11 | End: 2025-07-11

## 2025-07-11 RX ORDER — MIDAZOLAM HYDROCHLORIDE 1 MG/ML
INJECTION, SOLUTION INTRAMUSCULAR; INTRAVENOUS AS NEEDED
Status: COMPLETED | OUTPATIENT
Start: 2025-07-11 | End: 2025-07-11

## 2025-07-11 RX ADMIN — FENTANYL CITRATE 50 MCG: 50 INJECTION, SOLUTION INTRAMUSCULAR; INTRAVENOUS at 09:15

## 2025-07-11 RX ADMIN — MIDAZOLAM HYDROCHLORIDE 2 MG: 1 INJECTION, SOLUTION INTRAMUSCULAR; INTRAVENOUS at 09:15

## 2025-07-11 RX ADMIN — MIDAZOLAM HYDROCHLORIDE 2 MG: 1 INJECTION, SOLUTION INTRAMUSCULAR; INTRAVENOUS at 09:12

## 2025-07-11 RX ADMIN — FENTANYL CITRATE 50 MCG: 50 INJECTION, SOLUTION INTRAMUSCULAR; INTRAVENOUS at 09:12

## 2025-07-11 ASSESSMENT — COLUMBIA-SUICIDE SEVERITY RATING SCALE - C-SSRS
2. HAVE YOU ACTUALLY HAD ANY THOUGHTS OF KILLING YOURSELF?: NO
6. HAVE YOU EVER DONE ANYTHING, STARTED TO DO ANYTHING, OR PREPARED TO DO ANYTHING TO END YOUR LIFE?: NO
1. IN THE PAST MONTH, HAVE YOU WISHED YOU WERE DEAD OR WISHED YOU COULD GO TO SLEEP AND NOT WAKE UP?: NO

## 2025-07-11 ASSESSMENT — PAIN SCALES - GENERAL
PAINLEVEL_OUTOF10: 0 - NO PAIN

## 2025-07-11 ASSESSMENT — PAIN - FUNCTIONAL ASSESSMENT
PAIN_FUNCTIONAL_ASSESSMENT: 0-10

## 2025-07-11 NOTE — PROGRESS NOTES
Feels well.  She has changed her diet and is eating healthier with less carbs, higher protein, more fruits and vegetables.  This has helped improve her stools.    Flexible sigmoidoscopy shows minimal disease with 2 tiny aphthous a just on the ileal side of the anastomosis and a rim of erosion over 20 to 25% of the anastomotic rim.  Otherwise, the colonoscopy is normal.  Random biopsies taken from the colon.    Patient will continue on Stelara.  Will await biopsy results to make sure no dysplasia.  Will plan follow-up in approximately 1 year.

## 2025-07-11 NOTE — H&P
"History Of Present Illness  Justina Martinez \"Annette\" is a 59 y.o. female presenting with Crohn's disease for endoscopic evaluation.  S/P STC with IR anastomosis 6/2022.  On [facility].     Past Medical History  Medical History[1]  Surgical History  Surgical History[2]  Social History  She reports that she quit smoking about 37 years ago. Her smoking use included cigarettes. She started smoking about 45 years ago. She has a 4 pack-year smoking history. She has never used smokeless tobacco. She reports that she does not currently use alcohol. She reports that she does not use drugs.    Family History  Family History[3]     Allergies  Allergies[4]  Review of Systems  Pre-sedation Evaluation:  ASA Classification - ASA 2 - Patient with mild systemic disease with no functional limitations  Mallampati Score - III (soft and hard palate and base of uvula visible)    Physical Exam  Vital signs reviewed  Patient alert and oriented in no acute distress  Cardiac exam regular rate and rhythm S1-S2 without murmurs gallops or rubs  Lungs clear to auscultation bilaterally  Abdomen soft and nontender    Last Recorded Vitals  /65   Pulse 90   Temp 36.6 °C (97.9 °F) (Temporal)   Resp 20   Ht (!) 1.549 m (5' 1\")   Wt 102 kg (224 lb 3.3 oz)   SpO2 97%   BMI 42.36 kg/m²      Assessment/Plan   Colonoscopy to evaluate Crohn's disease     PTA/Current Medications:  Prescriptions Prior to Admission[5]  Current Medications[6]  Jett Dove MD         [1]   Past Medical History:  Diagnosis Date    Anemia     Anxiety     Arthritis     Asthma     Crohn's disease (Multi)     Essential (primary) hypertension 08/30/2022    Hypertension, unspecified type    GERD (gastroesophageal reflux disease)     Personal history of other endocrine, nutritional and metabolic disease     History of high cholesterol    Personal history of other endocrine, nutritional and metabolic disease     History of diabetes mellitus   [2]   Past Surgical " History:  Procedure Laterality Date    APPENDECTOMY  2022     SECTION, LOW TRANSVERSE  1989    COLON SURGERY  2022    OTHER SURGICAL HISTORY  02/15/2022     section    OTHER SURGICAL HISTORY  02/15/2022    Back surgery    OTHER SURGICAL HISTORY  02/15/2022    History of prior surgery    OTHER SURGICAL HISTORY  2022    Colonoscopy    OTHER SURGICAL HISTORY  2022    Appendectomy    OTHER SURGICAL HISTORY  2022    Colectomy subtotal   [3]   Family History  Problem Relation Name Age of Onset    Diabetes Mother Perla 60 - 69    Diabetes Sister Caitlin 50 - 59    Irritable bowel syndrome Father Gabriel 50 - 59   [4]   Allergies  Allergen Reactions    Erythromycin Hives and Unknown    Penicillins Hives and Unknown    Prednisone Anxiety   [5] (Not in a hospital admission)  [6]   Current Outpatient Medications   Medication Sig Dispense Refill    cholecalciferol (Vitamin D-3) 25 MCG (1000 UT) capsule Take 1 tablet by mouth once daily.      doxycycline (Periostat) 20 mg tablet take 1 tablet by mouth twice a day WITH A FULL GLASS OF WATER AND DO NOT TAKE WITH VITAMINS      famotidine (Pepcid) 40 mg tablet Take 1 tablet (40 mg) by mouth 2 times a day. 60 tablet 11    loperamide (Imodium) 2 mg capsule Take 2 capsules (4 mg) by mouth 3 times a day as needed for diarrhea. 180 capsule 3    losartan (Cozaar) 50 mg tablet Take 1 tablet (50 mg) by mouth once daily.      metoprolol succinate XL (Toprol-XL) 50 mg 24 hr tablet Take 1 tablet (50 mg) by mouth once daily. Do not crush or chew.      montelukast (Singulair) 10 mg tablet 1 tab by mouth daily      rosuvastatin (Crestor) 10 mg tablet       Stelara injection INJECT 90 MG UNDER THE SKIN EVERY 8 WEEKS 1 mL 5     No current facility-administered medications for this encounter.

## 2025-07-14 ENCOUNTER — DOCUMENTATION (OUTPATIENT)
Dept: GASTROENTEROLOGY | Facility: HOSPITAL | Age: 60
End: 2025-07-14
Payer: COMMERCIAL

## 2025-07-14 NOTE — PROGRESS NOTES
Spoke with patient.  C/O some upper/mid back pain since procedure.  No N/V.  No fever.  Eating OK.  Moving bowels.  Sleeping poorly due to back dicomfort.  Taking Pepcid AC, helps some.  Some CHRYSTAL symptoms prior to procedure.  Using Tylenol.  Seems to be a bit better today.  Low suspicion of procedure-related AE.  Asked patient to call later in week if symptoms persist.

## 2025-07-18 LAB
LABORATORY COMMENT REPORT: NORMAL
PATH REPORT.FINAL DX SPEC: NORMAL
PATH REPORT.GROSS SPEC: NORMAL
PATH REPORT.MICROSCOPIC SPEC OTHER STN: NORMAL
PATH REPORT.RELEVANT HX SPEC: NORMAL
PATH REPORT.TOTAL CANCER: NORMAL